# Patient Record
Sex: MALE | Race: WHITE | NOT HISPANIC OR LATINO | Employment: STUDENT | URBAN - METROPOLITAN AREA
[De-identification: names, ages, dates, MRNs, and addresses within clinical notes are randomized per-mention and may not be internally consistent; named-entity substitution may affect disease eponyms.]

---
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
## 2023-01-02 ENCOUNTER — TELEPHONE (OUTPATIENT)
Dept: URGENT CARE | Facility: CLINIC | Age: 18
End: 2023-01-02

## 2024-12-04 NOTE — TELEPHONE ENCOUNTER
I attempted to call no answer and unable to leave a message-any suggestions?    Please advise    Thank you   Left multiple messages on patient's phone number on file over the past week to discuss positive strep results  I have not heard back from the patient and have not received any call backs